# Patient Record
Sex: FEMALE | Race: WHITE | NOT HISPANIC OR LATINO | URBAN - METROPOLITAN AREA
[De-identification: names, ages, dates, MRNs, and addresses within clinical notes are randomized per-mention and may not be internally consistent; named-entity substitution may affect disease eponyms.]

---

## 2022-09-01 ENCOUNTER — APPOINTMENT (RX ONLY)
Dept: URBAN - METROPOLITAN AREA CLINIC 144 | Facility: CLINIC | Age: 77
Setting detail: DERMATOLOGY
End: 2022-09-01

## 2022-09-01 DIAGNOSIS — Z41.9 ENCOUNTER FOR PROCEDURE FOR PURPOSES OTHER THAN REMEDYING HEALTH STATE, UNSPECIFIED: ICD-10-CM

## 2022-09-01 PROCEDURE — ? COSMETIC FOLLOW-UP

## 2022-09-01 NOTE — PROCEDURE: COSMETIC FOLLOW-UP
Comments (Free Text): Patient is happy with her results. States her only area of concern is her lower lip. States she feels as though it is bumpy.
Detail Level: Zone

## 2022-09-01 NOTE — HPI: COSMETIC FOLLOW UP
What Condition Are We Treating?: Follow up to perioral area
What Procedure Did We Perform At The Last Visit?: Tung and Tania Ribeiro on 8/25/22

## 2023-01-05 ENCOUNTER — APPOINTMENT (RX ONLY)
Dept: URBAN - METROPOLITAN AREA CLINIC 144 | Facility: CLINIC | Age: 78
Setting detail: DERMATOLOGY
End: 2023-01-05

## 2023-01-05 DIAGNOSIS — Z41.9 ENCOUNTER FOR PROCEDURE FOR PURPOSES OTHER THAN REMEDYING HEALTH STATE, UNSPECIFIED: ICD-10-CM

## 2023-01-05 PROCEDURE — ? COSMETIC FOLLOW-UP

## 2023-01-05 NOTE — HPI: COSMETIC FOLLOW UP
What Condition Are We Treating?: Lines around her mouth
What Procedure Did We Perform At The Last Visit?: Restylane Refyne x 2, 1 Kysse and Botox.

## 2023-01-05 NOTE — PROCEDURE: COSMETIC FOLLOW-UP
Comments (Free Text): Patient had 2 Restylane Refyne to the lines around her lips, NLF and marionette lines with 1 Restylane Kysse and Botox to DEB 10,10, lower lip 2,2, chin 2,2,1,1 on 8/25/22. Patient states she was unhappy with the treatment, she didnât want to not look like herself and her lips were too full and crooked. \\nPatient states she is happy now but at first she did not like it. \\nAdvised patient we would melt the filler Iâd she doesnât like it. Patient states she does not want that, she is not unhappy now but felt like it was too much for her at first. \\nDiscussed with patient a good option for her to help with her filer fines and help reposition and improve skin texture would be Profound and or C02re laser. \\nPatient aware Profound will cause bruising for 7-14 days and it will stimulate collagen and peak at 2 months. \\nCo2re laser would be an option to resurface the skin but she will have 1 week of down time. \\nPatient states she does not want either option because of the down time. \\nPatient states she is okay with fillers but wants to make sure we do less filler next time because she felt like it was too much. \\nAdvised patient we could do less next time. \\nSuggested patient continue Botox to prevent the deep lines from reforming. Patient is concerned with doing the Botox again because she thinks that might be what really caused the crookedness that she did not like. \\nAdvised patient to make sure she keeps her 1 and 2 week follow up after filler and Botox to evaluate and discuss if any additional unitâs are needed if anything seems crooked. \\nUpper lip 2.5,2.5\\nLower lip 2,2\\nRestylane Kysse 1 syringe to lines around the lips. \\nPatient was given price quote, cancellation policy and pre instructions.
Detail Level: Zone

## 2023-06-15 ENCOUNTER — APPOINTMENT (RX ONLY)
Dept: URBAN - METROPOLITAN AREA CLINIC 144 | Facility: CLINIC | Age: 78
Setting detail: DERMATOLOGY
End: 2023-06-15

## 2023-06-15 DIAGNOSIS — Z41.9 ENCOUNTER FOR PROCEDURE FOR PURPOSES OTHER THAN REMEDYING HEALTH STATE, UNSPECIFIED: ICD-10-CM

## 2023-06-15 PROCEDURE — ? COSMETIC CONSULTATION: GENERAL

## 2023-06-27 ENCOUNTER — RX ONLY (OUTPATIENT)
Age: 78
Setting detail: RX ONLY
End: 2023-06-27

## 2023-06-27 RX ORDER — HYDROCODONE BITARTRATE AND ACETAMINOPHEN 5; 325 MG/1; MG/1
TABLET ORAL
Qty: 13 | Refills: 0 | Status: ERX | COMMUNITY
Start: 2023-06-27

## 2023-06-27 RX ORDER — ALPRAZOLAM 1 MG/1
TABLET ORAL
Qty: 4 | Refills: 0 | Status: ERX | COMMUNITY
Start: 2023-06-27

## 2023-06-29 ENCOUNTER — APPOINTMENT (RX ONLY)
Dept: URBAN - METROPOLITAN AREA CLINIC 144 | Facility: CLINIC | Age: 78
Setting detail: DERMATOLOGY
End: 2023-06-29

## 2023-06-29 DIAGNOSIS — Z41.9 ENCOUNTER FOR PROCEDURE FOR PURPOSES OTHER THAN REMEDYING HEALTH STATE, UNSPECIFIED: ICD-10-CM

## 2023-06-29 PROCEDURE — ? INVENTORY

## 2023-06-29 PROCEDURE — ? FILLERS

## 2023-06-29 NOTE — PROCEDURE: FILLERS
Dorsal Hands Filler  Volume In Cc: 0
Include Cannula Information In Note?: No
Number Of Syringes (Required For Inventory): 1
Additional Anesthesia Volume In Cc: 6
Inventory Information: This plan will send filler information to inventory based on the fillers you select. Multiple fillers can be sent but you must ensure you select the appropriate fillers in the inventory tab.
Detail Level: Zone
Topical Anesthesia?: 12% lidocaine, 12% tetracaine
Number Of Syringes (Required For Inventory): 3
Filler: Restylane Kysse
Filler: Redensity 1
Include Cannula Information In Note?: Yes
Consent: Written consent obtained. Risks include but not limited to bruising, beading, irregular texture, ulceration, infection, allergic reaction, scar formation, incomplete augmentation, temporary nature, procedural pain.
Post-Care Instructions: Patient instructed to apply ice to reduce swelling. Apply Dermaka cream for bruising. Patient may cover at bed time.
Filler: Sculptra
Map Statment: See [address] for Complete Details
Filler: Restylane Contour
Anesthesia Volume In Cc: 0.5
Filler Comments: Free contour , lot [id number]. 10/31/2023
Anesthesia Type: 1% lidocaine with epinephrine
Show Inventory Tab: Show

## 2023-07-06 ENCOUNTER — APPOINTMENT (RX ONLY)
Dept: URBAN - METROPOLITAN AREA CLINIC 144 | Facility: CLINIC | Age: 78
Setting detail: DERMATOLOGY
End: 2023-07-06

## 2023-07-06 DIAGNOSIS — Z41.9 ENCOUNTER FOR PROCEDURE FOR PURPOSES OTHER THAN REMEDYING HEALTH STATE, UNSPECIFIED: ICD-10-CM | Status: IMPROVED

## 2023-07-06 PROCEDURE — ? COSMETIC FOLLOW-UP

## 2023-07-06 ASSESSMENT — LOCATION SIMPLE DESCRIPTION DERM
LOCATION SIMPLE: RIGHT CHEEK
LOCATION SIMPLE: LEFT CHEEK

## 2023-07-06 ASSESSMENT — LOCATION DETAILED DESCRIPTION DERM
LOCATION DETAILED: LEFT CENTRAL MALAR CHEEK
LOCATION DETAILED: RIGHT CENTRAL MALAR CHEEK

## 2023-07-06 ASSESSMENT — LOCATION ZONE DERM: LOCATION ZONE: FACE

## 2023-07-06 NOTE — PROCEDURE: COSMETIC FOLLOW-UP
Global Improvement: Marked
Patient Satisfaction: Very pleased
Comments (Free Text): patient to follow up again in 6 weeks- may possibly need 3 RHA4 and 1 Radiesse for the cheeks and temples to help give more lift.  RHA @ [id number] each and Radiesse $144
Treatment (Optional): Filler Injection
Detail Level: Zone
Side Effects Or Complications: None

## 2023-07-15 ENCOUNTER — RX ONLY (OUTPATIENT)
Age: 78
Setting detail: RX ONLY
End: 2023-07-15

## 2023-08-17 ENCOUNTER — APPOINTMENT (RX ONLY)
Dept: URBAN - METROPOLITAN AREA CLINIC 144 | Facility: CLINIC | Age: 78
Setting detail: DERMATOLOGY
End: 2023-08-17

## 2023-08-17 DIAGNOSIS — Z41.9 ENCOUNTER FOR PROCEDURE FOR PURPOSES OTHER THAN REMEDYING HEALTH STATE, UNSPECIFIED: ICD-10-CM

## 2023-08-17 PROCEDURE — ? COSMETIC CONSULTATION: GENERAL

## 2023-08-17 PROCEDURE — ? COSMETIC FOLLOW-UP

## 2023-08-17 NOTE — PROCEDURE: COSMETIC FOLLOW-UP
Detail Level: Zone
Comments (Free Text): Following up for \\n\\nSculptra X 3 vials ( 10 ml dilution) were injected into the B/L zygoma, B/L malar, B/L submalar, perioral areas, chin and\\nB/L supramandibular areas and B/L temples. \\nHyperdilute Sculptra were injected into the B/L tear troughs, perioral areas and B/L temples. \\nRedensity X 1 syringe were injected into the deep perioral rhytids. \\nRestylane contour X 2 syringes were injected into the B/L NLF, B/L marionette lines, chin and deep rhytids on the B/L\\ninfero-medial cheeks. \\nRestylane Kysse X 1 syringe were injected into the B/L upper and lower lips\\n\\n\\nSatisfied with results, but feels like cheeks are sinking in still would like to know what else we can do to help. \\n\\nRecommending Sculptra x2 neck 12cc dilution (orange cannula)\\nRHA4 x3 cheeeks / NLF\\nVolux x2 jaw

## 2023-09-14 ENCOUNTER — APPOINTMENT (RX ONLY)
Dept: URBAN - METROPOLITAN AREA CLINIC 144 | Facility: CLINIC | Age: 78
Setting detail: DERMATOLOGY
End: 2023-09-14

## 2023-09-14 DIAGNOSIS — Z41.9 ENCOUNTER FOR PROCEDURE FOR PURPOSES OTHER THAN REMEDYING HEALTH STATE, UNSPECIFIED: ICD-10-CM

## 2023-09-14 PROCEDURE — ? COSMETIC CONSULTATION: GENERAL

## 2023-09-14 PROCEDURE — ? FILLERS

## 2023-09-14 PROCEDURE — ? ADDITIONAL NOTES

## 2023-09-14 NOTE — PROCEDURE: ADDITIONAL NOTES
Additional Notes: Recomending BLD with growth factor alternating with Cyspera and growth factor. \\nPatient given zo growth factor under eye serum free with purchase of 2 or more fillers
Detail Level: Simple
Render Risk Assessment In Note?: no

## 2023-09-14 NOTE — PROCEDURE: FILLERS
Use Map Statement For Sites (Optional): No
Marionette Lines Filler  Volume In Cc: 0
Number Of Syringes (Required For Inventory): 2
Number Of Syringes (Required For Inventory): 1
Map Statment: See [address] for Complete Details
Additional Comments: RHA4 @$650 when buying 2 or more
Filler: RHA 4
Anesthesia Type: 1% lidocaine with epinephrine
Number Of Syringes (Required For Inventory): 3
Anesthesia Volume In Cc: 0.5
Additional Anesthesia Volume In Cc: 6
Inventory Information: This plan will send filler information to inventory based on the fillers you select. Multiple fillers can be sent but you must ensure you select the appropriate fillers in the inventory tab.
Detail Level: Detailed
Show Inventory Tab: Show
Filler: Juvederm Volux XC
Consent: Written consent obtained. Risks include but not limited to bruising, beading, irregular texture, ulceration, infection, allergic reaction, scar formation, incomplete augmentation, temporary nature, procedural pain.
Post-Care Instructions: Patient instructed to apply ice to reduce swelling. Apply Dermaka cream for bruising. Patient may cover at bed time.

## 2023-09-21 ENCOUNTER — APPOINTMENT (RX ONLY)
Dept: URBAN - METROPOLITAN AREA CLINIC 144 | Facility: CLINIC | Age: 78
Setting detail: DERMATOLOGY
End: 2023-09-21

## 2023-09-21 DIAGNOSIS — Z41.9 ENCOUNTER FOR PROCEDURE FOR PURPOSES OTHER THAN REMEDYING HEALTH STATE, UNSPECIFIED: ICD-10-CM

## 2023-09-21 PROCEDURE — ? COSMETIC FOLLOW-UP

## 2023-09-21 NOTE — PROCEDURE: COSMETIC FOLLOW-UP
Global Improvement: Very Good
Comments (Free Text): FINALIZED)\\n2 Juvederm Volux XC Syringes Documented\\n3 RHA 4 Syringes Documented\\n\\nFiller: Juvederm Volux XC\\nSite(s): \\nJawline - 2 cc\\nDepth of Injection: submucosal plane\\n\\nAdditional Filler: RHA 4\\nSite(s): \\nCheeks - 2 cc\\nNasolabial Folds - 1 cc\\nDepth of Injection: submucosal
Patient Satisfaction: Very pleased
Treatment (Optional): Filler Injection
Detail Level: Zone
Side Effects Or Complications: None

## 2023-10-12 ENCOUNTER — APPOINTMENT (RX ONLY)
Dept: URBAN - METROPOLITAN AREA CLINIC 144 | Facility: CLINIC | Age: 78
Setting detail: DERMATOLOGY
End: 2023-10-12

## 2023-10-12 DIAGNOSIS — Z41.9 ENCOUNTER FOR PROCEDURE FOR PURPOSES OTHER THAN REMEDYING HEALTH STATE, UNSPECIFIED: ICD-10-CM

## 2023-10-12 PROCEDURE — ? BOTOX

## 2023-10-12 NOTE — PROCEDURE: BOTOX
Total Units: 0
Detail Level: Zone
Map Statement: Please see attached map for locations and injection amounts. \\n\\nGL 6,6,6\\nCf 14,14\\nDAO 16,16\\nTotal 78\\n10 units free oct promo. \\n\\nNext visit increase CF 16,16
Consent: Written consent obtained. Risks include but not limited to lid/brow ptosis, bruising, swelling, diplopia, temporary effect, incomplete chemical denervation.
Show Inventory Tab: Show
Post-Care Instructions: Patient instructed to not lie down for 4 hours and limit physical activity for 24 hours.

## 2024-01-18 ENCOUNTER — APPOINTMENT (RX ONLY)
Dept: URBAN - METROPOLITAN AREA CLINIC 144 | Facility: CLINIC | Age: 79
Setting detail: DERMATOLOGY
End: 2024-01-18

## 2024-01-18 DIAGNOSIS — Z41.9 ENCOUNTER FOR PROCEDURE FOR PURPOSES OTHER THAN REMEDYING HEALTH STATE, UNSPECIFIED: ICD-10-CM

## 2024-01-18 PROCEDURE — ? BOTOX

## 2024-01-18 NOTE — PROCEDURE: BOTOX
Total Units: 82
Consent: Written consent obtained. Risks include but not limited to lid/brow ptosis, bruising, swelling, diplopia, temporary effect, incomplete chemical denervation.
Show Inventory Tab: Show
Post-Care Instructions: Patient instructed to not lie down for 4 hours and limit physical activity for 24 hours.
Map Statement: Please see attached map for locations and injection amounts.\\n\\nGL 6,6,6\\nCf 16,16\\nDAO 16,16
Detail Level: Detailed

## 2024-04-11 ENCOUNTER — APPOINTMENT (RX ONLY)
Dept: URBAN - METROPOLITAN AREA CLINIC 144 | Facility: CLINIC | Age: 79
Setting detail: DERMATOLOGY
End: 2024-04-11

## 2024-04-11 DIAGNOSIS — Z41.9 ENCOUNTER FOR PROCEDURE FOR PURPOSES OTHER THAN REMEDYING HEALTH STATE, UNSPECIFIED: ICD-10-CM

## 2024-04-11 PROCEDURE — ? BOTOX

## 2024-07-11 ENCOUNTER — APPOINTMENT (RX ONLY)
Dept: URBAN - METROPOLITAN AREA CLINIC 144 | Facility: CLINIC | Age: 79
Setting detail: DERMATOLOGY
End: 2024-07-11

## 2024-07-11 DIAGNOSIS — Z41.9 ENCOUNTER FOR PROCEDURE FOR PURPOSES OTHER THAN REMEDYING HEALTH STATE, UNSPECIFIED: ICD-10-CM

## 2024-07-11 PROCEDURE — ? BOTOX

## 2024-08-08 ENCOUNTER — APPOINTMENT (RX ONLY)
Dept: URBAN - METROPOLITAN AREA CLINIC 144 | Facility: CLINIC | Age: 79
Setting detail: DERMATOLOGY
End: 2024-08-08

## 2024-08-08 DIAGNOSIS — Z41.9 ENCOUNTER FOR PROCEDURE FOR PURPOSES OTHER THAN REMEDYING HEALTH STATE, UNSPECIFIED: ICD-10-CM

## 2024-08-08 PROCEDURE — ? COSMETIC CONSULTATION: SOFWAVE

## 2024-08-08 PROCEDURE — ? COSMETIC CONSULTATION: GENERAL

## 2024-08-29 ENCOUNTER — APPOINTMENT (RX ONLY)
Dept: URBAN - METROPOLITAN AREA CLINIC 144 | Facility: CLINIC | Age: 79
Setting detail: DERMATOLOGY
End: 2024-08-29

## 2024-08-29 DIAGNOSIS — Z41.9 ENCOUNTER FOR PROCEDURE FOR PURPOSES OTHER THAN REMEDYING HEALTH STATE, UNSPECIFIED: ICD-10-CM

## 2024-08-29 PROCEDURE — ? COSMETIC CONSULTATION: FILLERS

## 2024-09-19 ENCOUNTER — APPOINTMENT (RX ONLY)
Dept: URBAN - METROPOLITAN AREA CLINIC 144 | Facility: CLINIC | Age: 79
Setting detail: DERMATOLOGY
End: 2024-09-19

## 2024-09-19 DIAGNOSIS — Z41.9 ENCOUNTER FOR PROCEDURE FOR PURPOSES OTHER THAN REMEDYING HEALTH STATE, UNSPECIFIED: ICD-10-CM

## 2024-09-19 PROCEDURE — ? ADDITIONAL NOTES

## 2024-09-19 PROCEDURE — ? INVENTORY

## 2024-09-19 PROCEDURE — ? PRO-NOX

## 2024-09-19 PROCEDURE — ? SOFWAVE

## 2024-09-19 ASSESSMENT — LOCATION ZONE DERM: LOCATION ZONE: FACE

## 2024-09-19 ASSESSMENT — LOCATION DETAILED DESCRIPTION DERM: LOCATION DETAILED: LEFT CHIN

## 2024-09-19 ASSESSMENT — LOCATION SIMPLE DESCRIPTION DERM: LOCATION SIMPLE: CHIN

## 2024-09-19 NOTE — PROCEDURE: ADDITIONAL NOTES
Render Risk Assessment In Note?: no
Additional Notes: Area 1 88 pulses \\nArea 2 179 pulses \\nArea 3 41  pulses \\nArea 4 105 pulses \\nArea 5 182 pulses
Detail Level: Simple

## 2024-09-19 NOTE — PROCEDURE: PRO-NOX
Detail Level: Zone
Post-Care Instructions: The patient was instructed to call the office if they had any lasting side effects or concerns.
Price (Use Numbers Only, No Special Characters Or $): 0
Consent: Written consent obtained, risks reviewed including but not limited to euphoria, light-headedness, nausea, vomiting and dizziness.
Pre-Procedure Text: The patient was connected to the Pro-Nox machine via mouth piece and positioned appropriately for the anticipated procedure.  Following the procedure they were disconnected and monitored for any lasting side effects prior to leaving the office.

## 2024-09-19 NOTE — PROCEDURE: SOFWAVE
Topical Anesthesia?: yes
Topical Anesthetic #1: tetracaine
Detail Level: Zone
Pulse Duration (In Sec): 5.0
Consent: Written consent obtained, risks reviewed including but not limited to crusting, scabbing, blistering, scarring, darker or lighter pigmentary change, need for addtional treatments, and incomplete result.
# Of Treatments In Package: 0
Treatment Number: 1
Post-Care Instructions: I reviewed with the patient in detail post-care instructions.
Post Cooling (In Sec): 1.0
Pre-Procedure: Prior to proceeding the treatment areas were cleaned and gel was applied.
Price $ (Use Numbers Only, No Text Please.): 3756
Location: face and neck
Post-Procedure Care: Normal mild skin redness was seen.\\nXanax 1mg one tablet.
Total Pulses: 595
Device: ClaimReturn
Topical Anesthetic #2: lidocaine
Topical Anesthetic Base: cream
Energy (Joules): 4.4

## 2024-10-03 ENCOUNTER — APPOINTMENT (RX ONLY)
Dept: URBAN - METROPOLITAN AREA CLINIC 144 | Facility: CLINIC | Age: 79
Setting detail: DERMATOLOGY
End: 2024-10-03

## 2024-10-03 DIAGNOSIS — Z41.9 ENCOUNTER FOR PROCEDURE FOR PURPOSES OTHER THAN REMEDYING HEALTH STATE, UNSPECIFIED: ICD-10-CM

## 2024-10-03 PROCEDURE — ? COSMETIC FOLLOW-UP

## 2024-10-03 PROCEDURE — ? BOTOX

## 2024-10-03 PROCEDURE — ? COSMETIC CONSULTATION: LASER RESURFACING

## 2024-10-03 NOTE — PROCEDURE: COSMETIC FOLLOW-UP
Treatment Override (Free Text): Val
Comments (Free Text): Location: face and neck\\nTreatment number: 1\\nDevice: Sofwave\\nEnergy: 4.4 J\\nPulse Duration: 5.0 sec\\nPost Coolin.0 sec\\nTotal Pulses: 595
Detail Level: Zone
Patient Satisfaction: Pleased
Side Effects Or Complications: None

## 2024-10-03 NOTE — PROCEDURE: BOTOX
Consent: Written consent obtained. Risks include but not limited to lid/brow ptosis, bruising, swelling, diplopia, temporary effect, incomplete chemical denervation.
Show Inventory Tab: Show
Post-Care Instructions: Patient instructed to not lie down for 4 hours and limit physical activity for 24 hours.
Map Statement: Please see attached map for locations and injection amounts.\\n\\nGL 6,6,6\\nCf 16,16\\nDAO 16,16\\nTotal 82\\n10 units free oct promo [id number]\\n12.00 Botox seminar price
Detail Level: Detailed

## 2025-01-23 ENCOUNTER — APPOINTMENT (OUTPATIENT)
Dept: URBAN - METROPOLITAN AREA CLINIC 144 | Facility: CLINIC | Age: 80
Setting detail: DERMATOLOGY
End: 2025-01-23

## 2025-01-23 DIAGNOSIS — Z41.9 ENCOUNTER FOR PROCEDURE FOR PURPOSES OTHER THAN REMEDYING HEALTH STATE, UNSPECIFIED: ICD-10-CM

## 2025-01-23 PROCEDURE — ? DAXXIFY

## 2025-01-23 NOTE — PROCEDURE: DAXXIFY
Show Inventory Tab: Show
Map Statement: Please see attached map for locations and injection amounts.\\nGl 12,12,12\\nCf 32,32\\nDao 32,32 \\nTotal 146
Detail Level: Detailed
Consent: Written consent obtained. Risks include but not limited to lid/brow ptosis, bruising, swelling, diplopia, temporary effect, incomplete chemical denervation.
Post-Care Instructions: Patient instructed to not lie down for 4 hours and limit physical activity for 24 hours.
Total Units: 0

## 2025-04-10 ENCOUNTER — APPOINTMENT (OUTPATIENT)
Dept: URBAN - METROPOLITAN AREA CLINIC 144 | Facility: CLINIC | Age: 80
Setting detail: DERMATOLOGY
End: 2025-04-10

## 2025-04-10 DIAGNOSIS — Z41.9 ENCOUNTER FOR PROCEDURE FOR PURPOSES OTHER THAN REMEDYING HEALTH STATE, UNSPECIFIED: ICD-10-CM

## 2025-04-10 PROCEDURE — ? COSMETIC CONSULTATION: SOFWAVE

## 2025-04-10 PROCEDURE — ? COSMETIC CONSULTATION: IPL

## 2025-04-10 PROCEDURE — ? ADDITIONAL NOTES

## 2025-04-10 NOTE — PROCEDURE: ADDITIONAL NOTES
Render Risk Assessment In Note?: no
Additional Notes: Next office visit Botox 12.00.
Detail Level: Simple

## 2025-05-08 ENCOUNTER — APPOINTMENT (OUTPATIENT)
Dept: URBAN - METROPOLITAN AREA CLINIC 144 | Facility: CLINIC | Age: 80
Setting detail: DERMATOLOGY
End: 2025-05-08

## 2025-05-08 DIAGNOSIS — Z41.9 ENCOUNTER FOR PROCEDURE FOR PURPOSES OTHER THAN REMEDYING HEALTH STATE, UNSPECIFIED: ICD-10-CM

## 2025-05-08 PROCEDURE — ? COSMETIC CONSULTATION: FILLERS

## 2025-05-08 PROCEDURE — ? COSMETIC CONSULTATION: COOLPEEL

## 2025-05-08 PROCEDURE — ? ADDITIONAL NOTES

## 2025-05-08 NOTE — PROCEDURE: ADDITIONAL NOTES
Detail Level: Simple
Render Risk Assessment In Note?: no
Additional Notes: Start cyspera topical treatment.

## 2025-05-29 ENCOUNTER — APPOINTMENT (OUTPATIENT)
Dept: URBAN - METROPOLITAN AREA CLINIC 144 | Facility: CLINIC | Age: 80
Setting detail: DERMATOLOGY
End: 2025-05-29

## 2025-05-29 DIAGNOSIS — Z41.9 ENCOUNTER FOR PROCEDURE FOR PURPOSES OTHER THAN REMEDYING HEALTH STATE, UNSPECIFIED: ICD-10-CM

## 2025-05-29 PROCEDURE — ? FILLERS

## 2025-05-29 PROCEDURE — ? DAXXIFY

## 2025-05-29 NOTE — PROCEDURE: DAXXIFY
Show Inventory Tab: Show
Map Statement: Please see attached map for locations and injection amounts.\\nGl 12,12,12\\nCf 32,32\\nDao 32,32 \\nChin 16 4x4)\\nLower lip 5,5 \\nTotal 172\\n\\nSeminar discount 6.50 per unit.
Detail Level: Detailed
Consent: Written consent obtained. Risks include but not limited to lid/brow ptosis, bruising, swelling, diplopia, temporary effect, incomplete chemical denervation.
Post-Care Instructions: Patient instructed to not lie down for 4 hours and limit physical activity for 24 hours.
Total Units: 0

## 2025-05-29 NOTE — PROCEDURE: FILLERS
Nasolabial Folds Filler Volume In Cc: 0
Filler Comments: 100.00 off may promo.
Post-Care Instructions: Patient instructed to apply ice to reduce swelling. Apply Dermaka cream for bruising. Patient may cover at bed time.
Inventory Information: This plan will send filler information to inventory based on the fillers you select. Multiple fillers can be sent but you must ensure you select the appropriate fillers in the inventory tab.
Number Of Syringes (Required For Inventory): 1
Additional Anesthesia Volume In Cc: 6
Anesthesia Type: 1% lidocaine with epinephrine
Include Cannula Information In Note?: No
Filler: Restylane Kysse
Detail Level: Zone
Anesthesia Volume In Cc: 0.5
Map Statment: See Attach Map for Complete Details
Consent: Written consent obtained. Risks include but not limited to bruising, beading, irregular texture, ulceration, infection, allergic reaction, scar formation, incomplete augmentation, temporary nature, procedural pain.
Show Inventory Tab: Show

## 2025-06-05 ENCOUNTER — APPOINTMENT (OUTPATIENT)
Dept: URBAN - METROPOLITAN AREA CLINIC 144 | Facility: CLINIC | Age: 80
Setting detail: DERMATOLOGY
End: 2025-06-05

## 2025-06-05 ENCOUNTER — RX ONLY (RX ONLY)
Age: 80
End: 2025-06-05

## 2025-06-05 DIAGNOSIS — Z41.9 ENCOUNTER FOR PROCEDURE FOR PURPOSES OTHER THAN REMEDYING HEALTH STATE, UNSPECIFIED: ICD-10-CM

## 2025-06-05 PROCEDURE — ? COSMETIC CONSULTATION: SOFWAVE

## 2025-06-05 PROCEDURE — ? COSMETIC FOLLOW-UP

## 2025-06-05 PROCEDURE — ? COSMETIC CONSULTATION: GENERAL

## 2025-06-05 PROCEDURE — ? COSMETIC CONSULTATION: FILLERS

## 2025-06-05 PROCEDURE — ? ADDITIONAL NOTES

## 2025-06-05 RX ORDER — TRETIONIN 0.25 MG/G
CREAM TOPICAL
Qty: 45 | Refills: 0 | Status: ERX | COMMUNITY
Start: 2025-06-05

## 2025-06-05 NOTE — PROCEDURE: COSMETIC FOLLOW-UP
Detail Level: Zone
Patient Satisfaction: Very pleased
Treatment Override (Free Text): Kysse filler for lips and Daxxify Gl 12,12,12 Cf 32,32 [place] 32,32 Chin 16 4x4) Lower lip 5,5 Total 172 5/29/25

## 2025-06-05 NOTE — PROCEDURE: ADDITIONAL NOTES
Render Risk Assessment In Note?: no
Detail Level: Generalized
Additional Notes: Additional Kysse recommended for lower lips

## 2025-06-06 ENCOUNTER — RX ONLY (RX ONLY)
Age: 80
End: 2025-06-06

## 2025-06-06 RX ORDER — TRETIONIN 0.25 MG/G
CREAM TOPICAL
Qty: 45 | Refills: 1 | Status: ERX

## 2025-08-21 ENCOUNTER — APPOINTMENT (OUTPATIENT)
Dept: URBAN - METROPOLITAN AREA CLINIC 144 | Facility: CLINIC | Age: 80
Setting detail: DERMATOLOGY
End: 2025-08-21

## 2025-08-21 DIAGNOSIS — Z41.9 ENCOUNTER FOR PROCEDURE FOR PURPOSES OTHER THAN REMEDYING HEALTH STATE, UNSPECIFIED: ICD-10-CM

## 2025-08-21 PROCEDURE — ? SOFWAVE

## 2025-08-21 PROCEDURE — ? INVENTORY

## 2025-08-28 ENCOUNTER — APPOINTMENT (OUTPATIENT)
Dept: URBAN - METROPOLITAN AREA CLINIC 144 | Facility: CLINIC | Age: 80
Setting detail: DERMATOLOGY
End: 2025-08-28

## 2025-08-28 DIAGNOSIS — Z41.9 ENCOUNTER FOR PROCEDURE FOR PURPOSES OTHER THAN REMEDYING HEALTH STATE, UNSPECIFIED: ICD-10-CM

## 2025-08-28 PROCEDURE — ? COSMETIC CONSULTATION: LASER RESURFACING

## 2025-08-28 PROCEDURE — ? COSMETIC CONSULTATION: COOLPEEL

## 2025-08-28 PROCEDURE — ? COSMETIC FOLLOW-UP
